# Patient Record
Sex: FEMALE | Race: OTHER | Employment: UNEMPLOYED | URBAN - METROPOLITAN AREA
[De-identification: names, ages, dates, MRNs, and addresses within clinical notes are randomized per-mention and may not be internally consistent; named-entity substitution may affect disease eponyms.]

---

## 2020-08-15 ENCOUNTER — APPOINTMENT (OUTPATIENT)
Dept: GENERAL RADIOLOGY | Age: 50
End: 2020-08-15
Payer: COMMERCIAL

## 2020-08-15 ENCOUNTER — HOSPITAL ENCOUNTER (EMERGENCY)
Age: 50
Discharge: HOME | End: 2020-08-15
Payer: COMMERCIAL

## 2020-08-15 VITALS
SYSTOLIC BLOOD PRESSURE: 111 MMHG | WEIGHT: 125 LBS | BODY MASS INDEX: 22.15 KG/M2 | DIASTOLIC BLOOD PRESSURE: 67 MMHG | RESPIRATION RATE: 18 BRPM | HEIGHT: 63 IN | OXYGEN SATURATION: 98 % | HEART RATE: 71 BPM | TEMPERATURE: 98.6 F

## 2020-08-15 PROCEDURE — 99283 EMERGENCY DEPT VISIT LOW MDM: CPT

## 2020-08-15 PROCEDURE — 73110 X-RAY EXAM OF WRIST: CPT

## 2020-08-15 PROCEDURE — 73130 X-RAY EXAM OF HAND: CPT

## 2020-08-15 PROCEDURE — 6370000000 HC RX 637 (ALT 250 FOR IP): Performed by: PHYSICIAN ASSISTANT

## 2020-08-15 RX ORDER — HYDROCODONE BITARTRATE AND ACETAMINOPHEN 5; 325 MG/1; MG/1
1 TABLET ORAL EVERY 6 HOURS PRN
Qty: 10 TABLET | Refills: 0 | Status: SHIPPED | OUTPATIENT
Start: 2020-08-15 | End: 2020-08-18

## 2020-08-15 RX ORDER — HYDROCODONE BITARTRATE AND ACETAMINOPHEN 5; 325 MG/1; MG/1
1 TABLET ORAL ONCE
Status: COMPLETED | OUTPATIENT
Start: 2020-08-15 | End: 2020-08-15

## 2020-08-15 RX ORDER — IBUPROFEN 600 MG/1
600 TABLET ORAL EVERY 6 HOURS PRN
Qty: 20 TABLET | Refills: 0 | Status: SHIPPED | OUTPATIENT
Start: 2020-08-15

## 2020-08-15 RX ADMIN — HYDROCODONE BITARTRATE AND ACETAMINOPHEN 1 TABLET: 5; 325 TABLET ORAL at 09:38

## 2020-08-15 ASSESSMENT — ENCOUNTER SYMPTOMS
BACK PAIN: 0
EYE PAIN: 0
RHINORRHEA: 0
COUGH: 0
PHOTOPHOBIA: 0
DIARRHEA: 0
SORE THROAT: 0
VOMITING: 0
NAUSEA: 0
ABDOMINAL PAIN: 0
SHORTNESS OF BREATH: 0

## 2020-08-15 ASSESSMENT — PAIN DESCRIPTION - ORIENTATION: ORIENTATION: RIGHT

## 2020-08-15 ASSESSMENT — PAIN DESCRIPTION - DESCRIPTORS: DESCRIPTORS: ACHING;SHOOTING;THROBBING

## 2020-08-15 ASSESSMENT — PAIN SCALES - GENERAL
PAINLEVEL_OUTOF10: 9
PAINLEVEL_OUTOF10: 7

## 2020-08-15 ASSESSMENT — PAIN DESCRIPTION - PAIN TYPE: TYPE: ACUTE PAIN

## 2020-08-15 ASSESSMENT — PAIN DESCRIPTION - LOCATION: LOCATION: HAND

## 2020-08-15 NOTE — ED PROVIDER NOTES
[address] [facility]  EMERGENCY DEPARTMENT ENCOUNTER      Pt Name: Algernon Duane  MRN: [id number]  [place] 1970  Date of evaluation: 8/15/2020  Provider: Skip Conroy PA-C      HISTORY OF PRESENT ILLNESS    Algernon Duane is a 52 y.o. female who presents to the Emergency Department with right wrist pain and thumb pain that happened yesterday after she fell. Pain is constant throbbing and shoots through her hand. She tried otc motrin and ice with no relief. Denies numbness or tingling. Pain is worse with movement and better with rest. She has swelling to thumb. REVIEW OF SYSTEMS       Review of Systems   Constitutional: Negative for chills, diaphoresis, fatigue and fever. HENT: Negative for congestion, rhinorrhea and sore throat. Eyes: Negative for photophobia and pain. Respiratory: Negative for cough and shortness of breath. Cardiovascular: Negative for chest pain and palpitations. Gastrointestinal: Negative for abdominal pain, diarrhea, nausea and vomiting. Genitourinary: Negative for dysuria and flank pain. Musculoskeletal: Positive for arthralgias, joint swelling and myalgias. Negative for back pain. Skin: Negative for rash. Neurological: Negative for dizziness, light-headedness and headaches. Psychiatric/Behavioral: Negative. All other systems reviewed and are negative. PAST MEDICAL HISTORY     Past Medical History:   Diagnosis Date    Bowel obstruction ([address]          SURGICAL HISTORY     History reviewed. No pertinent surgical history. CURRENT MEDICATIONS       Previous Medications    No medications on file       ALLERGIES     No known drug allergy    FAMILY HISTORY     History reviewed. No pertinent family history.        SOCIAL HISTORY       Social History     Socioeconomic History    Marital status:      Spouse name: None    Number of children: None    Years of education: None    Highest education level: None   Occupational History    Tenderness: There is no abdominal tenderness. Musculoskeletal:      Right wrist: She exhibits tenderness. Right hand: She exhibits tenderness, bony tenderness and swelling. Hands:       Comments: difusse tenderness to wrist and palm. Pt has point tenderness to thumb with swelling. rom limited, likely secondary pain. Lymphadenopathy:      Cervical: No cervical adenopathy. Skin:     General: Skin is warm and dry. Capillary Refill: Capillary refill takes less than 2 seconds. Findings: No rash. Neurological:      Mental Status: She is alert and oriented to person, place, and time. Psychiatric:         Thought Content: Thought content normal.         Judgment: Judgment normal.           All other labs were within normal range or not returned as of this dictation. EMERGENCY DEPARTMENT COURSE and DIFFERENTIALDIAGNOSIS/MDM:   Vitals:    Vitals:    08/15/20 0919   BP: 111/67   Pulse: 71   Resp: 18   Temp: 98.6 °F (37 °C)   SpO2: 98%   Weight: 125 lb (56.7 kg)   Height: 5' 3\" (1.6 m)            Pt presents as described. N/v intact. Medicated in ed for pain. Pt has distal thumb fracture. Given thumb spica and instructed to f/u with ortho in a few days. Advised to use RICE. Given 3 days script of [address] [employer]. PROCEDURES:  Unless otherwise noted below, none     Procedures      FINAL IMPRESSION      1. Closed nondisplaced fracture of distal phalanx of right thumb, initial encounter    2.  Sprain of right wrist, initial encounter          DISPOSITION/PLAN   DISPOSITION Decision To Discharge 08/15/2020 10:13:24 AM          Margaux Nova PA-C (electronically signed)  Attending Emergency Physician       Margaux Nova PA-C  08/15/20 1026

## 2020-08-15 NOTE — ED TRIAGE NOTES
Pt was running yesterday when she tripped on sidewalk and landed on right hand.  Pt having pain which she rates at 9/10 that is throbbing, aching and shooting in nature to the right hand/wrist.

## 2020-11-22 ENCOUNTER — APPOINTMENT (OUTPATIENT)
Dept: GENERAL RADIOLOGY | Age: 50
End: 2020-11-22

## 2020-11-22 ENCOUNTER — HOSPITAL ENCOUNTER (EMERGENCY)
Age: 50
Discharge: HOME | End: 2020-11-22

## 2020-11-22 VITALS
RESPIRATION RATE: 18 BRPM | OXYGEN SATURATION: 97 % | BODY MASS INDEX: 21.17 KG/M2 | HEART RATE: 73 BPM | SYSTOLIC BLOOD PRESSURE: 112 MMHG | TEMPERATURE: 98.1 F | WEIGHT: 124 LBS | DIASTOLIC BLOOD PRESSURE: 69 MMHG | HEIGHT: 64 IN

## 2020-11-22 PROCEDURE — 99284 EMERGENCY DEPT VISIT MOD MDM: CPT

## 2020-11-22 PROCEDURE — 73630 X-RAY EXAM OF FOOT: CPT

## 2020-11-22 PROCEDURE — 6370000000 HC RX 637 (ALT 250 FOR IP): Performed by: NURSE PRACTITIONER

## 2020-11-22 RX ORDER — OXYCODONE HYDROCHLORIDE AND ACETAMINOPHEN 5; 325 MG/1; MG/1
1 TABLET ORAL ONCE
Status: COMPLETED | OUTPATIENT
Start: 2020-11-22 | End: 2020-11-22

## 2020-11-22 RX ORDER — OXYCODONE HYDROCHLORIDE AND ACETAMINOPHEN 5; 325 MG/1; MG/1
1 TABLET ORAL EVERY 6 HOURS PRN
Qty: 10 TABLET | Refills: 0 | Status: SHIPPED | OUTPATIENT
Start: 2020-11-22 | End: 2020-11-25

## 2020-11-22 RX ADMIN — OXYCODONE HYDROCHLORIDE AND ACETAMINOPHEN 1 TABLET: 5; 325 TABLET ORAL at 10:35

## 2020-11-22 ASSESSMENT — ENCOUNTER SYMPTOMS
ABDOMINAL PAIN: 0
BACK PAIN: 0
COUGH: 0
SHORTNESS OF BREATH: 0

## 2020-11-22 ASSESSMENT — PAIN SCALES - GENERAL
PAINLEVEL_OUTOF10: 8
PAINLEVEL_OUTOF10: 9

## 2020-11-22 ASSESSMENT — PAIN DESCRIPTION - LOCATION: LOCATION: TOE (COMMENT WHICH ONE)

## 2020-11-22 ASSESSMENT — PAIN DESCRIPTION - ORIENTATION: ORIENTATION: LEFT

## 2020-11-22 ASSESSMENT — PAIN DESCRIPTION - DESCRIPTORS: DESCRIPTORS: CONSTANT;BURNING

## 2020-11-22 NOTE — ED NOTES
Ortho shoe placed to left foot,  CAPILLARY REFILL LESS THAN 3, +3 PEDALS,  HURTS TO MOVE FOOT WHEN WALKING.      Benigno Joe RN  11/22/20 7372

## 2020-11-22 NOTE — ED PROVIDER NOTES
[address] [facility] ED  eMERGENCY dEPARTMENT eNCOUnter      Pt Name: James Winslow  MRN: [id number]  [place] 1970  Date of evaluation: 11/22/2020  Provider: BG Hyde CNP      HISTORY OF PRESENT ILLNESS    James Winslow is a 48 y.o. female who presents to the Emergency Department with L 5th toe pain after hitting toe on a bed frame. Patient is able to ambulate without difficulty. Pain is moderate. REVIEW OF SYSTEMS       Review of Systems   Constitutional: Negative for fever. HENT: Negative for congestion. Respiratory: Negative for cough and shortness of breath. Cardiovascular: Negative for chest pain. Gastrointestinal: Negative for abdominal pain. Genitourinary: Negative for dysuria. Musculoskeletal: Negative for arthralgias and back pain. L toe pain 5th digit. Skin: Negative for rash. All other systems reviewed and are negative. PAST MEDICAL HISTORY     Past Medical History:   Diagnosis Date    Bowel obstruction ([address]          SURGICAL HISTORY     History reviewed. No pertinent surgical history. CURRENT MEDICATIONS       Previous Medications    IBUPROFEN (ADVIL;MOTRIN) 600 MG TABLET    Take 1 tablet by mouth every 6 hours as needed for Pain       ALLERGIES     No known drug allergy    FAMILY HISTORY     History reviewed. No pertinent family history.        SOCIAL HISTORY       Social History     Socioeconomic History    Marital status:      Spouse name: None    Number of children: None    Years of education: None    Highest education level: None   Occupational History    None   Social Needs    Financial resource strain: None    Food insecurity     Worry: None     Inability: None    Transportation needs     Medical: None     Non-medical: None   Tobacco Use    Smoking status: Never Smoker    Smokeless tobacco: Never Used   Substance and Sexual Activity    Alcohol use: Yes     Comment: socially     Drug use: Never    Sexual activity: None   Lifestyle    Physical activity     Days per week: None     Minutes per session: None    Stress: None   Relationships    Social connections     Talks on phone: None     Gets together: None     Attends Muslim service: None     Active member of club or organization: None     Attends meetings of clubs or organizations: None     Relationship status: None    Intimate partner violence     Fear of current or ex partner: None     Emotionally abused: None     Physically abused: None     Forced sexual activity: None   Other Topics Concern    None   Social History Narrative    None       SCREENINGS      @FLOW([id number])@      PHYSICAL EXAM    (up to 7 for level 4, 8 or more for level 5)     ED Triage Vitals [11/22/20 1019]   BP Temp Temp Source Pulse Resp SpO2 Height Weight   112/69 98.1 °F (36.7 °C) Oral 73 18 97 % 5' 3.5\" (1.613 m) 124 lb (56.2 kg)       Physical Exam  Vitals signs and nursing note reviewed. Constitutional:       Appearance: She is well-developed. HENT:      Head: Normocephalic and atraumatic. Right Ear: External ear normal.      Left Ear: External ear normal.   Eyes:      Conjunctiva/sclera: Conjunctivae normal.      Pupils: Pupils are equal, round, and reactive to light. Neck:      Musculoskeletal: Normal range of motion and neck supple. Cardiovascular:      Rate and Rhythm: Normal rate and regular rhythm. Pulmonary:      Effort: Pulmonary effort is normal.      Breath sounds: Normal breath sounds. Abdominal:      General: Bowel sounds are normal. There is no distension. Palpations: Abdomen is soft. Tenderness: There is no abdominal tenderness. Musculoskeletal: Normal range of motion. Left foot: Tenderness present. Feet:    Skin:     General: Skin is warm and dry. Neurological:      Mental Status: She is alert and oriented to person, place, and time. Deep Tendon Reflexes: Reflexes are normal and symmetric.    Psychiatric: Judgment: Judgment normal.           All other labs were within normal range or not returned as of this dictation. EMERGENCY DEPARTMENT COURSE and DIFFERENTIALDIAGNOSIS/MDM:   Vitals:    Vitals:    11/22/20 1019   BP: 112/69   Pulse: 73   Resp: 18   Temp: 98.1 °F (36.7 °C)   TempSrc: Oral   SpO2: 97%   Weight: 124 lb (56.2 kg)   Height: 5' 3.5\" (1.613 m)                  PROCEDURES:  Unless otherwise noted below, none     Procedures      FINAL IMPRESSION      1.  Closed displaced fracture of middle phalanx of lesser toe of left foot, initial encounter          DISPOSITION/PLAN   DISPOSITION Decision To Discharge 11/22/2020 11:08:02 AM          BG Lindsay CNP (electronically signed)  Attending Emergency Physician     BG Lindsay CNP  11/25/20 1019

## 2020-11-22 NOTE — ED NOTES
Bed: 26  Expected date:   Expected time:   Means of arrival:   Comments:     Callum Molina RN  11/22/20 8003

## 2022-04-28 ENCOUNTER — APPOINTMENT (OUTPATIENT)
Dept: CT IMAGING | Age: 52
End: 2022-04-28
Payer: COMMERCIAL

## 2022-04-28 ENCOUNTER — HOSPITAL ENCOUNTER (EMERGENCY)
Age: 52
Discharge: HOME | End: 2022-04-29
Attending: EMERGENCY MEDICINE
Payer: COMMERCIAL

## 2022-04-28 DIAGNOSIS — F10.920 ACUTE ALCOHOLIC INTOXICATION WITHOUT COMPLICATION (HCC): Primary | ICD-10-CM

## 2022-04-28 LAB
ALBUMIN SERPL-MCNC: 4.5 G/DL (ref 3.5–4.6)
ALP BLD-CCNC: 107 U/L (ref 40–130)
ALT SERPL-CCNC: 17 U/L (ref 0–33)
ANION GAP SERPL CALCULATED.3IONS-SCNC: 17 MEQ/L (ref 9–15)
AST SERPL-CCNC: 44 U/L (ref 0–35)
BASOPHILS ABSOLUTE: 0 K/UL (ref 0–0.1)
BASOPHILS RELATIVE PERCENT: 0.8 % (ref 0.1–1.2)
BILIRUB SERPL-MCNC: 0.9 MG/DL (ref 0.2–0.7)
BUN BLDV-MCNC: 9 MG/DL (ref 6–20)
CALCIUM SERPL-MCNC: 8.7 MG/DL (ref 8.5–9.9)
CHLORIDE BLD-SCNC: 109 MEQ/L (ref 95–107)
CO2: 20 MEQ/L (ref 20–31)
CREAT SERPL-MCNC: 0.5 MG/DL (ref 0.5–0.9)
EOSINOPHILS ABSOLUTE: 0.1 K/UL (ref 0–0.4)
EOSINOPHILS RELATIVE PERCENT: 1 % (ref 0.7–5.8)
ETHANOL PERCENT: 0.42 G/DL
ETHANOL: 477 MG/DL (ref 0–0.08)
GFR AFRICAN AMERICAN: >60
GFR NON-AFRICAN AMERICAN: >60
GLOBULIN: 2.5 G/DL (ref 2.3–3.5)
GLUCOSE BLD-MCNC: 109 MG/DL (ref 70–99)
HCT VFR BLD CALC: 38.8 % (ref 37–47)
HEMOGLOBIN: 13.6 G/DL (ref 11.2–15.7)
IMMATURE GRANULOCYTES #: 0 K/UL
IMMATURE GRANULOCYTES %: 0.2 %
LYMPHOCYTES ABSOLUTE: 2.5 K/UL (ref 1.2–3.7)
LYMPHOCYTES RELATIVE PERCENT: 47.9 %
MAGNESIUM: 2.1 MG/DL (ref 1.7–2.4)
MCH RBC QN AUTO: 30.8 PG (ref 25.6–32.2)
MCHC RBC AUTO-ENTMCNC: 35.1 % (ref 32.2–35.5)
MCV RBC AUTO: 88 FL (ref 79.4–94.8)
MONOCYTES ABSOLUTE: 0.3 K/UL (ref 0.2–0.9)
MONOCYTES RELATIVE PERCENT: 6.1 % (ref 4.7–12.5)
NEUTROPHILS ABSOLUTE: 2.3 K/UL (ref 1.6–6.1)
NEUTROPHILS RELATIVE PERCENT: 44 % (ref 34–71.1)
PDW BLD-RTO: 13 % (ref 11.7–14.4)
PLATELET # BLD: 117 K/UL (ref 182–369)
POTASSIUM SERPL-SCNC: 4 MEQ/L (ref 3.4–4.9)
RBC # BLD: 4.41 M/UL (ref 3.93–5.22)
SODIUM BLD-SCNC: 146 MEQ/L (ref 135–144)
TOTAL PROTEIN: 7 G/DL (ref 6.3–8)
TROPONIN: <0.01 NG/ML (ref 0–0.01)
WBC # BLD: 5.2 K/UL (ref 4–10)

## 2022-04-28 PROCEDURE — 84484 ASSAY OF TROPONIN QUANT: CPT

## 2022-04-28 PROCEDURE — 2580000003 HC RX 258: Performed by: EMERGENCY MEDICINE

## 2022-04-28 PROCEDURE — 85025 COMPLETE CBC W/AUTO DIFF WBC: CPT

## 2022-04-28 PROCEDURE — 93005 ELECTROCARDIOGRAM TRACING: CPT

## 2022-04-28 PROCEDURE — 82077 ASSAY SPEC XCP UR&BREATH IA: CPT

## 2022-04-28 PROCEDURE — 36415 COLL VENOUS BLD VENIPUNCTURE: CPT

## 2022-04-28 PROCEDURE — 70450 CT HEAD/BRAIN W/O DYE: CPT

## 2022-04-28 PROCEDURE — 80053 COMPREHEN METABOLIC PANEL: CPT

## 2022-04-28 PROCEDURE — 83735 ASSAY OF MAGNESIUM: CPT

## 2022-04-28 PROCEDURE — 99284 EMERGENCY DEPT VISIT MOD MDM: CPT

## 2022-04-28 RX ORDER — 0.9 % SODIUM CHLORIDE 0.9 %
1000 INTRAVENOUS SOLUTION INTRAVENOUS ONCE
Status: COMPLETED | OUTPATIENT
Start: 2022-04-28 | End: 2022-04-28

## 2022-04-28 RX ORDER — GABAPENTIN 300 MG/1
1 CAPSULE ORAL DAILY
COMMUNITY

## 2022-04-28 RX ORDER — FOLIC ACID 1 MG/1
1 TABLET ORAL DAILY
Qty: 90 TABLET | Refills: 1 | Status: SHIPPED | OUTPATIENT
Start: 2022-04-28

## 2022-04-28 RX ORDER — THIAMINE MONONITRATE (VIT B1) 100 MG
100 TABLET ORAL DAILY
Qty: 30 TABLET | Refills: 3 | Status: SHIPPED | OUTPATIENT
Start: 2022-04-28

## 2022-04-28 RX ADMIN — SODIUM CHLORIDE 1000 ML: 900 INJECTION, SOLUTION INTRAVENOUS at 21:36

## 2022-04-28 ASSESSMENT — ENCOUNTER SYMPTOMS
SINUS PRESSURE: 0
PHOTOPHOBIA: 0
VOMITING: 0
BACK PAIN: 0
COUGH: 0
COLOR CHANGE: 0
RHINORRHEA: 0
CONSTIPATION: 0
SHORTNESS OF BREATH: 0
ABDOMINAL PAIN: 0
SORE THROAT: 0
WHEEZING: 0
APNEA: 0
ABDOMINAL DISTENTION: 0
EYE PAIN: 0
DIARRHEA: 0
NAUSEA: 0

## 2022-04-28 ASSESSMENT — PAIN - FUNCTIONAL ASSESSMENT: PAIN_FUNCTIONAL_ASSESSMENT: NONE - DENIES PAIN

## 2022-04-29 VITALS
HEIGHT: 65 IN | SYSTOLIC BLOOD PRESSURE: 118 MMHG | HEART RATE: 80 BPM | RESPIRATION RATE: 18 BRPM | BODY MASS INDEX: 20.83 KG/M2 | OXYGEN SATURATION: 95 % | TEMPERATURE: 98.7 F | DIASTOLIC BLOOD PRESSURE: 85 MMHG | WEIGHT: 125 LBS

## 2022-04-29 LAB
AMPHETAMINE SCREEN, URINE: NORMAL
ANION GAP SERPL CALCULATED.3IONS-SCNC: 11 MEQ/L (ref 9–15)
BACTERIA: ABNORMAL /HPF
BARBITURATE SCREEN URINE: NORMAL
BENZODIAZEPINE SCREEN, URINE: NORMAL
BILIRUBIN URINE: NEGATIVE
BLOOD, URINE: NEGATIVE
BUN BLDV-MCNC: 11 MG/DL (ref 6–20)
CALCIUM SERPL-MCNC: 7.8 MG/DL (ref 8.5–9.9)
CANNABINOID SCREEN URINE: NORMAL
CHLORIDE BLD-SCNC: 115 MEQ/L (ref 95–107)
CLARITY: CLEAR
CO2: 22 MEQ/L (ref 20–31)
COCAINE METABOLITE SCREEN URINE: NORMAL
COLOR: YELLOW
CREAT SERPL-MCNC: 0.47 MG/DL (ref 0.5–0.9)
EPITHELIAL CELLS, UA: ABNORMAL /HPF
ETHANOL PERCENT: 0.26 G/DL
ETHANOL: 296 MG/DL (ref 0–0.08)
GFR AFRICAN AMERICAN: >60
GFR NON-AFRICAN AMERICAN: >60
GLUCOSE BLD-MCNC: 93 MG/DL (ref 70–99)
GLUCOSE URINE: NEGATIVE MG/DL
KETONES, URINE: NEGATIVE MG/DL
LEUKOCYTE ESTERASE, URINE: NORMAL
Lab: NORMAL
MUCUS: PRESENT /LPF
NITRITE, URINE: NEGATIVE
OPIATE SCREEN URINE: NORMAL
PH UA: 6 (ref 5–9)
PHENCYCLIDINE SCREEN URINE: NORMAL
POTASSIUM SERPL-SCNC: 4 MEQ/L (ref 3.4–4.9)
PROTEIN UA: NEGATIVE MG/DL
RBC UA: ABNORMAL /HPF (ref 0–2)
SODIUM BLD-SCNC: 148 MEQ/L (ref 135–144)
SPECIFIC GRAVITY UA: 1.02 (ref 1–1.03)
TRICYCLIC, URINE: NORMAL
URINE REFLEX TO CULTURE: NORMAL
UROBILINOGEN, URINE: 1 E.U./DL
WBC UA: ABNORMAL /HPF (ref 0–5)

## 2022-04-29 PROCEDURE — 2500000003 HC RX 250 WO HCPCS: Performed by: EMERGENCY MEDICINE

## 2022-04-29 PROCEDURE — 6360000002 HC RX W HCPCS: Performed by: EMERGENCY MEDICINE

## 2022-04-29 PROCEDURE — 96365 THER/PROPH/DIAG IV INF INIT: CPT

## 2022-04-29 PROCEDURE — 82077 ASSAY SPEC XCP UR&BREATH IA: CPT

## 2022-04-29 PROCEDURE — 80048 BASIC METABOLIC PNL TOTAL CA: CPT

## 2022-04-29 PROCEDURE — 36415 COLL VENOUS BLD VENIPUNCTURE: CPT

## 2022-04-29 PROCEDURE — 96376 TX/PRO/DX INJ SAME DRUG ADON: CPT

## 2022-04-29 PROCEDURE — 96366 THER/PROPH/DIAG IV INF ADDON: CPT

## 2022-04-29 PROCEDURE — 81001 URINALYSIS AUTO W/SCOPE: CPT

## 2022-04-29 PROCEDURE — 2580000003 HC RX 258: Performed by: EMERGENCY MEDICINE

## 2022-04-29 PROCEDURE — 96375 TX/PRO/DX INJ NEW DRUG ADDON: CPT

## 2022-04-29 PROCEDURE — 80306 DRUG TEST PRSMV INSTRMNT: CPT

## 2022-04-29 RX ORDER — LORAZEPAM 2 MG/ML
1 INJECTION INTRAMUSCULAR ONCE
Status: COMPLETED | OUTPATIENT
Start: 2022-04-29 | End: 2022-04-29

## 2022-04-29 RX ADMIN — THIAMINE HYDROCHLORIDE: 100 INJECTION, SOLUTION INTRAMUSCULAR; INTRAVENOUS at 03:01

## 2022-04-29 RX ADMIN — LORAZEPAM 1 MG: 2 INJECTION INTRAMUSCULAR; INTRAVENOUS at 09:29

## 2022-04-29 RX ADMIN — LORAZEPAM 1 MG: 2 INJECTION INTRAMUSCULAR; INTRAVENOUS at 12:59

## 2022-04-29 ASSESSMENT — PAIN - FUNCTIONAL ASSESSMENT: PAIN_FUNCTIONAL_ASSESSMENT: NONE - DENIES PAIN

## 2022-04-29 NOTE — ED NOTES
Pt cleaned of urine, new gown and linen provided. Pt ambulated with assistance to the bathroom.       Alondra Acosta RN  04/29/22 3708

## 2022-04-29 NOTE — ED NOTES
Pt ambulated to and from restroom with CGA. UA obtained and sent to lab. Pt back to bed, position of comfort. Pt drank 1 orange juice and 1 apple juice.       Jeanne Monroy RN  04/29/22 3760

## 2022-04-29 NOTE — ED NOTES
[facility] [employer] representative [facility] states that [facility] in [facility] is willing to take patient for treatment after evaluating ER chart and labs, states he isnt leaving until he can transport patient to treatment facility     Ralph Dear, RN  04/29/22 0526

## 2022-04-29 NOTE — ED NOTES
Spoke with Safia Knight from [facility], states they are sending a  over to begin paperwork with patient and work on getting placement for her. States after speaking with staff member from yesterday, they were waiting on [facility] acceptance.      [address] [facility], [address] [place] [address]  04/29/22 0002

## 2022-04-29 NOTE — ED PROVIDER NOTES
[address] [facility] ED  eMERGENCY dEPARTMENT eNCOUnter      Pt Name: Jose De Jesus Queen  MRN: [id number]  [place] 1970  Date of evaluation: 4/28/2022  Provider: Pallavi Dumont MD    CHIEF COMPLAINT       Chief Complaint   Patient presents with    Alcohol Intoxication     unknown amount of hard alcohol AOx1 alert to self. HISTORY OF PRESENT ILLNESS   (Location/Symptom, Timing/Onset,Context/Setting, Quality, Duration, Modifying Factors, Severity)  Note limiting factors. Jose De Jesus Queen is a 46 y.o. female who presents to the emergency department with complaint of acute alcohol intoxication.  came into the house and found her in stupor, with empty bottle of wine by her side. Spouse has been trying to get her into alcohol rehab. No other complaints. No other systemic symptoms. She was not answering questions in the ED but appears to be comfortable and in no acute distress. HPI    Nursing Notes were reviewed. REVIEW OF SYSTEMS    (2-9 systems for level 4, 10 or more for level 5)     Review of Systems   Constitutional: Negative. Negative for activity change, appetite change, chills, fatigue and fever. HENT: Negative for congestion, ear discharge, ear pain, hearing loss, rhinorrhea, sinus pressure and sore throat. Eyes: Negative for photophobia, pain and visual disturbance. Respiratory: Negative for apnea, cough, shortness of breath and wheezing. Cardiovascular: Negative for chest pain, palpitations and leg swelling. Gastrointestinal: Negative for abdominal distention, abdominal pain, constipation, diarrhea, nausea and vomiting. Endocrine: Negative for cold intolerance, heat intolerance and polyuria. Genitourinary: Negative for dysuria, flank pain, frequency and urgency. Musculoskeletal: Negative for arthralgias, back pain, gait problem, myalgias and neck stiffness. Skin: Negative for color change, pallor and rash.    Allergic/Immunologic: Negative for food allergies and immunocompromised state. Neurological: Negative for dizziness, tremors, syncope, weakness, light-headedness and headaches. Psychiatric/Behavioral: Negative for agitation, confusion and hallucinations. All other systems reviewed and are negative. Except as noted above the remainder of the review of systems was reviewed and negative. PAST MEDICAL HISTORY     Past Medical History:   Diagnosis Date    Bowel obstruction ([facility] [address]          SURGICAL HISTORY     History reviewed. No pertinent surgical history. CURRENT MEDICATIONS       Previous Medications    GABAPENTIN (NEURONTIN) 300 MG CAPSULE    Take 1 capsule by mouth daily. IBUPROFEN (ADVIL;MOTRIN) 600 MG TABLET    Take 1 tablet by mouth every 6 hours as needed for Pain       ALLERGIES     No known drug allergy    FAMILY HISTORY     History reviewed. No pertinent family history. SOCIAL HISTORY       Social History     Socioeconomic History    Marital status:      Spouse name: None    Number of children: None    Years of education: None    Highest education level: None   Occupational History    None   Tobacco Use    Smoking status: Never Smoker    Smokeless tobacco: Never Used   Substance and Sexual Activity    Alcohol use: Yes     Comment: socially     Drug use: Never    Sexual activity: None   Other Topics Concern    None   Social History Narrative    None     Social Determinants of Health     Financial Resource Strain:     Difficulty of Paying Living Expenses: Not on file   Food Insecurity:     Worried About Running Out of Food in the Last Year: Not on file    Ashleigh of Food in the Last Year: Not on file   Transportation Needs:     Lack of Transportation (Medical): Not on file    Lack of Transportation (Non-Medical):  Not on file   Physical Activity:     Days of Exercise per Week: Not on file    Minutes of Exercise per Session: Not on file   Stress:     Feeling of Stress : Not on file   Social Connections:     Frequency of Communication with Friends and Family: Not on file    Frequency of Social Gatherings with Friends and Family: Not on file    Attends Anabaptism Services: Not on file    Active Member of Clubs or Organizations: Not on file    Attends Club or Organization Meetings: Not on file    Marital Status: Not on file   Intimate Partner Violence:     Fear of Current or Ex-Partner: Not on file    Emotionally Abused: Not on file    Physically Abused: Not on file    Sexually Abused: Not on file   Housing Stability:     Unable to Pay for Housing in the Last Year: Not on file    Number of [place] in the Last Year: Not on file    Unstable Housing in the Last Year: Not on file       SCREENINGS             PHYSICAL EXAM    (up to 7 for level 4, 8 or more for level 5)     ED Triage Vitals [04/28/22 2041]   BP Temp Temp src Pulse Resp SpO2 Height Weight   122/82 -- -- 73 16 96 % 5' 5\" (1.651 m) 125 lb (56.7 kg)       Physical Exam  Vitals and nursing note reviewed. Constitutional:       General: She is not in acute distress. Appearance: Normal appearance. She is well-developed. She is not ill-appearing, toxic-appearing or diaphoretic. Comments: Inebriated. HENT:      Head: Normocephalic and atraumatic. Nose: Nose normal. No congestion or rhinorrhea. Mouth/Throat:      Mouth: Mucous membranes are moist.      Pharynx: Oropharynx is clear. No oropharyngeal exudate or posterior oropharyngeal erythema. Eyes:      General: No scleral icterus. Right eye: No discharge. Left eye: No discharge. Extraocular Movements: Extraocular movements intact. Conjunctiva/sclera: Conjunctivae normal.      Pupils: Pupils are equal, round, and reactive to light. Neck:      Thyroid: No thyromegaly. Vascular: No carotid bruit or JVD. Trachea: No tracheal deviation. Cardiovascular:      Rate and Rhythm: Normal rate and regular rhythm. Pulses: Normal pulses.       Heart sounds: Normal heart sounds. No murmur heard. No friction rub. No gallop. Pulmonary:      Effort: Pulmonary effort is normal. No respiratory distress. Breath sounds: Normal breath sounds. No stridor. No wheezing, rhonchi or rales. Chest:      Chest wall: No tenderness. Abdominal:      General: Abdomen is flat. Bowel sounds are normal. There is no distension. Palpations: Abdomen is soft. There is no mass. Tenderness: There is no abdominal tenderness. There is no right CVA tenderness, left CVA tenderness, guarding or rebound. Hernia: No hernia is present. Musculoskeletal:         General: No swelling, tenderness, deformity or signs of injury. Normal range of motion. Cervical back: Normal range of motion and neck supple. No rigidity or tenderness. Right lower leg: No edema. Left lower leg: No edema. Lymphadenopathy:      Cervical: No cervical adenopathy. Skin:     General: Skin is warm and dry. Capillary Refill: Capillary refill takes less than 2 seconds. Coloration: Skin is not jaundiced or pale. Findings: No bruising, erythema, lesion or rash. Neurological:      General: No focal deficit present. Mental Status: She is alert and oriented to person, place, and time. Mental status is at baseline. Cranial Nerves: No cranial nerve deficit. Sensory: No sensory deficit. Motor: No weakness or abnormal muscle tone. Coordination: Coordination normal.      Gait: Gait normal.      Deep Tendon Reflexes: Reflexes are normal and symmetric. Reflexes normal.   Psychiatric:         Behavior: Behavior normal.         Thought Content: Thought content normal.         Judgment: Judgment normal.      Comments: Strong smell of alcohol on breath.          DIAGNOSTIC RESULTS     EKG: All EKG's are interpreted by the Emergency Department Physician who either signs or Co-signs this chart in the absence of a cardiologist.    Twelve-lead EKG shows normal sinus rhythm, rate 68 bpm, normal intervals, normal electrical axis, no acute ST-T wave changes. RADIOLOGY:   Non-plain film images such as CT, Ultrasound and MRI are read by the radiologist. Vero Camacho radiographicimages are visualized and preliminarily interpreted by the emergency physician with the below findings:        Interpretation per the Radiologist below, if available at the time of this note:    CT Head WO Contrast    (Results Pending)         ED BEDSIDE ULTRASOUND:   Performed by ED Physician - none    LABS:  Labs Reviewed   COMPREHENSIVE METABOLIC PANEL - Abnormal; Notable for the following components:       Result Value    Sodium 146 (*)     Chloride 109 (*)     Anion Gap 17 (*)     Glucose 109 (*)     Total Bilirubin 0.9 (*)     AST 44 (*)     All other components within normal limits   CBC WITH AUTO DIFFERENTIAL - Abnormal; Notable for the following components:    Platelets 146 (*)     All other components within normal limits   MAGNESIUM   ETHANOL   TROPONIN   URINALYSIS WITH REFLEX TO CULTURE   URINE DRUG SCREEN, COMPREHENSIVE   BASIC METABOLIC PANEL   ETHANOL       All other labs were within normal range or not returned as of this dictation.     EMERGENCY DEPARTMENT COURSE and DIFFERENTIALDIAGNOSIS/MDM:   Vitals:    Vitals:    04/28/22 2330 04/29/22 0435 04/29/22 0620 04/29/22 0636   BP:  112/78 117/81    Pulse: 70 61 68    Resp:  16 16    Temp:    98.7 °F (37.1 °C)   TempSrc:    Oral   SpO2: 95% 95% 92%    Weight:       Height:               MDM  Number of Diagnoses or Management Options     Amount and/or Complexity of Data Reviewed  Clinical lab tests: reviewed and ordered  Tests in the radiology section of CPT®: reviewed and ordered  Tests in the medicine section of CPT®: reviewed and ordered    Risk of Complications, Morbidity, and/or Mortality  Presenting problems: moderate  Diagnostic procedures: moderate  Management options: moderate    Patient Progress  Patient progress: improved      CRITICAL CARE TIME   Total Critical Care time was  minutes, excluding separately reportable procedures. There was a high probability of clinically significant/life threatening deterioration in the patient's condition which required my urgentintervention. CONSULTS:  None    PROCEDURES:  Unless otherwise noted below, none     Procedures    FINAL IMPRESSION      1.  Acute alcoholic intoxication without complication [facility])          DISPOSITION/PLAN   DISPOSITION Discharge - Pending Orders Complete 04/29/2022 05:59:35 AM      PATIENT REFERRED TO:  Jamia Lee  [address] [place] [address],[address] [address]  [phone]    In 3 days        DISCHARGE MEDICATIONS:  New Prescriptions    FOLIC ACID (FOLVITE) 1 MG TABLET    Take 1 tablet by mouth daily    VITAMIN B-1 (THIAMINE) 100 MG TABLET    Take 1 tablet by mouth daily          (Please note that portions of this note were completed with a voice recognitionprogram.  Efforts were made to edit the dictations but occasionally words are mis-transcribed.)    Oksana Pallas, MD (electronically signed)  Attending Emergency Physician          Oksana Pallas, MD  04/29/22 1144       Oksana Pallas, MD  04/29/22 8177

## 2022-04-29 NOTE — ED NOTES
Pt accepted to [address] in [place], [address] dressed self and ambulated to [employer]74 vehicle with steady gait.      [address] [facility], [address] [place] [address]  04/29/22 6700

## 2022-04-29 NOTE — ED NOTES
Pt sleeping, easily woke up for lab draw. Pt denies any discomfort. Agreeable to breakfast, dietary notified. Pt repositioned self in bed moving all extremities. IV running. Pillows adjusted.       [facility], RN  04/29/22 4886

## 2022-04-29 NOTE — ED NOTES
Pt comes to the ED from home with spouse. Per spouse he flew in from out of town [facility] and found her in a heavily intoxicated states. He states patient was on the phone with tulio rowe real Yamilet Bertrand) earlier today to try and get her help. States he went outside and when he came back in the patient was more intoxicated and he was advised by lets get real to bring her to the ED. Pt in mumbling incomprehensible words.       Flores Toscano RN  04/28/22 9095

## 2022-04-29 NOTE — ED NOTES
arrived to ER stating he is leaving to take his child to an [place] of [place] sporting event. States they have two children ages 15 and 16 and he needs to be with them .      Lizandronadine Arias [address] [place] [address]  04/29/22 8193

## 2022-04-30 LAB
EKG ATRIAL RATE: 68 BPM
EKG P AXIS: 24 DEGREES
EKG P-R INTERVAL: 172 MS
EKG Q-T INTERVAL: 442 MS
EKG QRS DURATION: 96 MS
EKG QTC CALCULATION (BAZETT): 469 MS
EKG R AXIS: 76 DEGREES
EKG T AXIS: 37 DEGREES
EKG VENTRICULAR RATE: 68 BPM

## 2022-04-30 PROCEDURE — 93010 ELECTROCARDIOGRAM REPORT: CPT | Performed by: INTERNAL MEDICINE
